# Patient Record
Sex: FEMALE | Race: OTHER | URBAN - METROPOLITAN AREA
[De-identification: names, ages, dates, MRNs, and addresses within clinical notes are randomized per-mention and may not be internally consistent; named-entity substitution may affect disease eponyms.]

---

## 2018-08-15 ENCOUNTER — APPOINTMENT (OUTPATIENT)
Dept: URBAN - METROPOLITAN AREA SURGERY 9 | Age: 48
Setting detail: DERMATOLOGY
End: 2018-08-16

## 2018-08-15 VITALS — SYSTOLIC BLOOD PRESSURE: 100 MMHG | HEART RATE: 64 BPM | RESPIRATION RATE: 12 BRPM | DIASTOLIC BLOOD PRESSURE: 64 MMHG

## 2018-08-15 PROBLEM — C44.311 BASAL CELL CARCINOMA OF SKIN OF NOSE: Status: ACTIVE | Noted: 2018-08-15

## 2018-08-15 PROBLEM — Z85.828 PERSONAL HISTORY OF OTHER MALIGNANT NEOPLASM OF SKIN: Status: ACTIVE | Noted: 2018-08-15

## 2018-08-15 PROCEDURE — OTHER MOHS SURGERY: OTHER

## 2018-08-15 PROCEDURE — OTHER CONSULTATION FOR MOHS SURGERY: OTHER

## 2018-08-15 PROCEDURE — 17311 MOHS 1 STAGE H/N/HF/G: CPT

## 2018-08-15 PROCEDURE — OTHER RETURN TO REFERRING PROVIDER: OTHER

## 2018-08-15 NOTE — PROCEDURE: CONSULTATION FOR MOHS SURGERY
Size Of Lesion: -
X Size Of Lesion In Cm (Optional): 0
Incorporate Mauc In Note: No
Detail Level: Detailed

## 2018-08-15 NOTE — PROCEDURE: MOHS SURGERY
Body Location Override (Optional - Billing Will Still Be Based On Selected Body Map Location If Applicable): Left upper nasal sidewall

## 2019-07-08 NOTE — PROCEDURE: MOHS SURGERY
none none none O-T Advancement Flap Text: The defect edges were debeveled with a #15 scalpel blade.  Given the location of the defect, shape of the defect and the proximity to free margins an O-T advancement flap was deemed most appropriate.  Using a sterile surgical marker, an appropriate advancement flap was drawn incorporating the defect and placing the expected incisions within the relaxed skin tension lines where possible.    The area thus outlined was incised deep to adipose tissue with a #15 scalpel blade.  The skin margins were undermined to an appropriate distance in all directions utilizing iris scissors.

## 2022-03-09 NOTE — HPI: MOHS SURGERY CONSULTATION
oriented to person, place and time , normal sensation , short and long term memory intact , oriented to person, place and time , normal sensation , short and long term memory intact
Has The Cancer Been Biopsied Before?: has been previously biopsied
Who Is Your Referring Provider?: Dr. Egan
When Was Your Biopsy?: 07/30/2018